# Patient Record
Sex: MALE | Race: WHITE
[De-identification: names, ages, dates, MRNs, and addresses within clinical notes are randomized per-mention and may not be internally consistent; named-entity substitution may affect disease eponyms.]

---

## 2019-12-03 ENCOUNTER — HOSPITAL ENCOUNTER (INPATIENT)
Dept: HOSPITAL 8 - OUT | Age: 74
LOS: 1 days | Discharge: HOME | DRG: 352 | End: 2019-12-04
Attending: SURGERY | Admitting: SURGERY
Payer: MEDICARE

## 2019-12-03 VITALS — BODY MASS INDEX: 22.14 KG/M2 | HEIGHT: 69 IN | WEIGHT: 149.47 LBS

## 2019-12-03 VITALS — SYSTOLIC BLOOD PRESSURE: 159 MMHG | DIASTOLIC BLOOD PRESSURE: 80 MMHG

## 2019-12-03 VITALS — DIASTOLIC BLOOD PRESSURE: 98 MMHG | SYSTOLIC BLOOD PRESSURE: 193 MMHG

## 2019-12-03 VITALS — SYSTOLIC BLOOD PRESSURE: 165 MMHG | DIASTOLIC BLOOD PRESSURE: 91 MMHG

## 2019-12-03 DIAGNOSIS — Z82.49: ICD-10-CM

## 2019-12-03 DIAGNOSIS — N40.0: ICD-10-CM

## 2019-12-03 DIAGNOSIS — I10: ICD-10-CM

## 2019-12-03 DIAGNOSIS — K40.90: Primary | ICD-10-CM

## 2019-12-03 DIAGNOSIS — E78.5: ICD-10-CM

## 2019-12-03 PROCEDURE — 0YU54JZ SUPPLEMENT RIGHT INGUINAL REGION WITH SYNTHETIC SUBSTITUTE, PERCUTANEOUS ENDOSCOPIC APPROACH: ICD-10-PCS | Performed by: SURGERY

## 2019-12-03 PROCEDURE — 8E0W4CZ ROBOTIC ASSISTED PROCEDURE OF TRUNK REGION, PERCUTANEOUS ENDOSCOPIC APPROACH: ICD-10-PCS | Performed by: SURGERY

## 2019-12-03 PROCEDURE — C1781 MESH (IMPLANTABLE): HCPCS

## 2019-12-03 RX ADMIN — IBUPROFEN SCH MG: 800 TABLET ORAL at 21:05

## 2019-12-03 RX ADMIN — IBUPROFEN SCH MG: 800 TABLET ORAL at 16:00

## 2019-12-03 RX ADMIN — SODIUM CHLORIDE, PRESERVATIVE FREE SCH ML: 5 INJECTION INTRAVENOUS at 21:00

## 2019-12-04 VITALS — SYSTOLIC BLOOD PRESSURE: 160 MMHG | DIASTOLIC BLOOD PRESSURE: 87 MMHG

## 2019-12-04 VITALS — SYSTOLIC BLOOD PRESSURE: 152 MMHG | DIASTOLIC BLOOD PRESSURE: 74 MMHG

## 2019-12-04 VITALS — DIASTOLIC BLOOD PRESSURE: 76 MMHG | SYSTOLIC BLOOD PRESSURE: 167 MMHG

## 2019-12-04 VITALS — DIASTOLIC BLOOD PRESSURE: 87 MMHG | SYSTOLIC BLOOD PRESSURE: 180 MMHG

## 2019-12-04 RX ADMIN — IBUPROFEN SCH MG: 800 TABLET ORAL at 09:06

## 2019-12-04 RX ADMIN — SODIUM CHLORIDE, PRESERVATIVE FREE SCH ML: 5 INJECTION INTRAVENOUS at 07:45

## 2019-12-07 ENCOUNTER — HOSPITAL ENCOUNTER (INPATIENT)
Dept: HOSPITAL 8 - ED | Age: 74
LOS: 11 days | Discharge: HOME | DRG: 329 | End: 2019-12-18
Attending: FAMILY MEDICINE | Admitting: EMERGENCY MEDICINE
Payer: MEDICARE

## 2019-12-07 VITALS — BODY MASS INDEX: 24.13 KG/M2 | WEIGHT: 162.92 LBS | HEIGHT: 69 IN

## 2019-12-07 VITALS — DIASTOLIC BLOOD PRESSURE: 76 MMHG | SYSTOLIC BLOOD PRESSURE: 122 MMHG

## 2019-12-07 DIAGNOSIS — J96.01: ICD-10-CM

## 2019-12-07 DIAGNOSIS — N40.0: ICD-10-CM

## 2019-12-07 DIAGNOSIS — J69.0: ICD-10-CM

## 2019-12-07 DIAGNOSIS — I10: ICD-10-CM

## 2019-12-07 DIAGNOSIS — E87.8: ICD-10-CM

## 2019-12-07 DIAGNOSIS — D64.9: ICD-10-CM

## 2019-12-07 DIAGNOSIS — K92.0: ICD-10-CM

## 2019-12-07 DIAGNOSIS — E78.5: ICD-10-CM

## 2019-12-07 DIAGNOSIS — E87.1: ICD-10-CM

## 2019-12-07 DIAGNOSIS — C85.90: ICD-10-CM

## 2019-12-07 DIAGNOSIS — K56.52: Primary | ICD-10-CM

## 2019-12-07 DIAGNOSIS — G93.41: ICD-10-CM

## 2019-12-07 DIAGNOSIS — N17.0: ICD-10-CM

## 2019-12-07 DIAGNOSIS — E87.6: ICD-10-CM

## 2019-12-07 DIAGNOSIS — E86.0: ICD-10-CM

## 2019-12-07 LAB
ALBUMIN SERPL-MCNC: 3.5 G/DL (ref 3.4–5)
ALP SERPL-CCNC: 57 U/L (ref 45–117)
ALT SERPL-CCNC: 22 U/L (ref 12–78)
ANION GAP SERPL CALC-SCNC: 10 MMOL/L (ref 5–15)
ANION GAP SERPL CALC-SCNC: 9 MMOL/L (ref 5–15)
APTT BLD: 24 SECONDS (ref 25–31)
BASOPHILS # BLD AUTO: 0.01 X10^3/UL (ref 0–0.1)
BASOPHILS # BLD AUTO: 0.01 X10^3/UL (ref 0–0.1)
BASOPHILS NFR BLD AUTO: 0 % (ref 0–1)
BASOPHILS NFR BLD AUTO: 0 % (ref 0–1)
BILIRUB SERPL-MCNC: 1.6 MG/DL (ref 0.2–1)
CALCIUM SERPL-MCNC: 11.1 MG/DL (ref 8.5–10.1)
CALCIUM SERPL-MCNC: 9.7 MG/DL (ref 8.5–10.1)
CHLORIDE SERPL-SCNC: 84 MMOL/L (ref 98–107)
CHLORIDE SERPL-SCNC: 90 MMOL/L (ref 98–107)
CREAT SERPL-MCNC: 1.45 MG/DL (ref 0.7–1.3)
CREAT SERPL-MCNC: 1.96 MG/DL (ref 0.7–1.3)
EOSINOPHIL # BLD AUTO: 0.01 X10^3/UL (ref 0–0.4)
EOSINOPHIL # BLD AUTO: 0.1 X10^3/UL (ref 0–0.4)
EOSINOPHIL NFR BLD AUTO: 0 % (ref 1–7)
EOSINOPHIL NFR BLD AUTO: 1 % (ref 1–7)
ERYTHROCYTE [DISTWIDTH] IN BLOOD BY AUTOMATED COUNT: 14.3 % (ref 9.4–14.8)
ERYTHROCYTE [DISTWIDTH] IN BLOOD BY AUTOMATED COUNT: 14.6 % (ref 9.4–14.8)
INR PPP: 0.93 (ref 0.93–1.1)
LYMPHOCYTES # BLD AUTO: 0.68 X10^3/UL (ref 1–3.4)
LYMPHOCYTES # BLD AUTO: 0.83 X10^3/UL (ref 1–3.4)
LYMPHOCYTES NFR BLD AUTO: 7 % (ref 22–44)
LYMPHOCYTES NFR BLD AUTO: 8 % (ref 22–44)
MCH RBC QN AUTO: 34.9 PG (ref 27.5–34.5)
MCH RBC QN AUTO: 34.9 PG (ref 27.5–34.5)
MCHC RBC AUTO-ENTMCNC: 33.8 G/DL (ref 33.2–36.2)
MCHC RBC AUTO-ENTMCNC: 34.1 G/DL (ref 33.2–36.2)
MCV RBC AUTO: 102.6 FL (ref 81–97)
MCV RBC AUTO: 103.2 FL (ref 81–97)
MD: (no result)
MD: NO
MONOCYTES # BLD AUTO: 0.43 X10^3/UL (ref 0.2–0.8)
MONOCYTES # BLD AUTO: 0.8 X10^3/UL (ref 0.2–0.8)
MONOCYTES NFR BLD AUTO: 5 % (ref 2–9)
MONOCYTES NFR BLD AUTO: 7 % (ref 2–9)
NEUTROPHILS # BLD AUTO: 10.66 X10^3/UL (ref 1.8–6.8)
NEUTROPHILS # BLD AUTO: 6.94 X10^3/UL (ref 1.8–6.8)
NEUTROPHILS NFR BLD AUTO: 85 % (ref 42–75)
NEUTROPHILS NFR BLD AUTO: 87 % (ref 42–75)
PLATELET # BLD AUTO: 123 X10^3/UL (ref 130–400)
PLATELET # BLD AUTO: 141 X10^3/UL (ref 130–400)
PMV BLD AUTO: 8.8 FL (ref 7.4–10.4)
PMV BLD AUTO: 9.1 FL (ref 7.4–10.4)
PROT SERPL-MCNC: 6.3 G/DL (ref 6.4–8.2)
PROTHROMBIN TIME: 9.8 SECONDS (ref 9.6–11.5)
RBC # BLD AUTO: 3.62 X10^6/UL (ref 4.38–5.82)
RBC # BLD AUTO: 3.89 X10^6/UL (ref 4.38–5.82)

## 2019-12-07 PROCEDURE — 84443 ASSAY THYROID STIM HORMONE: CPT

## 2019-12-07 PROCEDURE — 74245: CPT

## 2019-12-07 PROCEDURE — 85610 PROTHROMBIN TIME: CPT

## 2019-12-07 PROCEDURE — 94640 AIRWAY INHALATION TREATMENT: CPT

## 2019-12-07 PROCEDURE — 84145 PROCALCITONIN (PCT): CPT

## 2019-12-07 PROCEDURE — 81003 URINALYSIS AUTO W/O SCOPE: CPT

## 2019-12-07 PROCEDURE — 74018 RADEX ABDOMEN 1 VIEW: CPT

## 2019-12-07 PROCEDURE — 96374 THER/PROPH/DIAG INJ IV PUSH: CPT

## 2019-12-07 PROCEDURE — 74022 RADEX COMPL AQT ABD SERIES: CPT

## 2019-12-07 PROCEDURE — 36600 WITHDRAWAL OF ARTERIAL BLOOD: CPT

## 2019-12-07 PROCEDURE — 87324 CLOSTRIDIUM AG IA: CPT

## 2019-12-07 PROCEDURE — 83605 ASSAY OF LACTIC ACID: CPT

## 2019-12-07 PROCEDURE — 80069 RENAL FUNCTION PANEL: CPT

## 2019-12-07 PROCEDURE — 85025 COMPLETE CBC W/AUTO DIFF WBC: CPT

## 2019-12-07 PROCEDURE — 71045 X-RAY EXAM CHEST 1 VIEW: CPT

## 2019-12-07 PROCEDURE — 83735 ASSAY OF MAGNESIUM: CPT

## 2019-12-07 PROCEDURE — 88307 TISSUE EXAM BY PATHOLOGIST: CPT

## 2019-12-07 PROCEDURE — 93005 ELECTROCARDIOGRAM TRACING: CPT

## 2019-12-07 PROCEDURE — 80053 COMPREHEN METABOLIC PANEL: CPT

## 2019-12-07 PROCEDURE — 80048 BASIC METABOLIC PNL TOTAL CA: CPT

## 2019-12-07 PROCEDURE — 87081 CULTURE SCREEN ONLY: CPT

## 2019-12-07 PROCEDURE — 96375 TX/PRO/DX INJ NEW DRUG ADDON: CPT

## 2019-12-07 PROCEDURE — C9113 INJ PANTOPRAZOLE SODIUM, VIA: HCPCS

## 2019-12-07 PROCEDURE — 82803 BLOOD GASES ANY COMBINATION: CPT

## 2019-12-07 PROCEDURE — 85730 THROMBOPLASTIN TIME PARTIAL: CPT

## 2019-12-07 PROCEDURE — 36415 COLL VENOUS BLD VENIPUNCTURE: CPT

## 2019-12-07 PROCEDURE — 83690 ASSAY OF LIPASE: CPT

## 2019-12-07 PROCEDURE — 84100 ASSAY OF PHOSPHORUS: CPT

## 2019-12-07 RX ADMIN — SODIUM CHLORIDE AND POTASSIUM CHLORIDE SCH MLS/HR: 9; 2.98 INJECTION, SOLUTION INTRAVENOUS at 23:00

## 2019-12-07 RX ADMIN — SODIUM CHLORIDE AND POTASSIUM CHLORIDE SCH MLS/HR: 9; 2.98 INJECTION, SOLUTION INTRAVENOUS at 15:59

## 2019-12-07 RX ADMIN — PANTOPRAZOLE SODIUM SCH MLS/HR: 40 INJECTION, POWDER, FOR SOLUTION INTRAVENOUS at 23:14

## 2019-12-07 NOTE — NUR
PT HAS CO OF NOT RECOVERING FROM INGUINAL RIGHT SIDE HERNIA SURGERY, 
LAPROSCOPIC. PT STATES HE HAS HAD HICCUPS SINCE SURGERY AND VOMITTING "DARK 
BLACK GRANULES" AT NIGHT. PT ALSO UNABLE TO KEEP FOOD DOWN AND HAS GOTTEN 
WEAKER. PT CONSULTED SURGEON ABOUT CONCERNS AND WAS TOLD TO COME TO ED. PT NOT 
IN DISTRESS. LAST BM WAS WEDNESDAY. DENIES PASSING GAS.

## 2019-12-08 VITALS — SYSTOLIC BLOOD PRESSURE: 137 MMHG | DIASTOLIC BLOOD PRESSURE: 78 MMHG

## 2019-12-08 VITALS — SYSTOLIC BLOOD PRESSURE: 149 MMHG | DIASTOLIC BLOOD PRESSURE: 76 MMHG

## 2019-12-08 VITALS — SYSTOLIC BLOOD PRESSURE: 132 MMHG | DIASTOLIC BLOOD PRESSURE: 75 MMHG

## 2019-12-08 VITALS — DIASTOLIC BLOOD PRESSURE: 69 MMHG | SYSTOLIC BLOOD PRESSURE: 104 MMHG

## 2019-12-08 LAB
ANION GAP SERPL CALC-SCNC: 7 MMOL/L (ref 5–15)
BASOPHILS # BLD AUTO: 0.02 X10^3/UL (ref 0–0.1)
BASOPHILS NFR BLD AUTO: 0 % (ref 0–1)
CALCIUM SERPL-MCNC: 8.9 MG/DL (ref 8.5–10.1)
CHLORIDE SERPL-SCNC: 101 MMOL/L (ref 98–107)
CREAT SERPL-MCNC: 0.91 MG/DL (ref 0.7–1.3)
CULTURE INDICATED?: NO
EOSINOPHIL # BLD AUTO: 0.17 X10^3/UL (ref 0–0.4)
EOSINOPHIL NFR BLD AUTO: 3 % (ref 1–7)
ERYTHROCYTE [DISTWIDTH] IN BLOOD BY AUTOMATED COUNT: 14.7 % (ref 9.4–14.8)
LYMPHOCYTES # BLD AUTO: 0.61 X10^3/UL (ref 1–3.4)
LYMPHOCYTES NFR BLD AUTO: 11 % (ref 22–44)
MCH RBC QN AUTO: 35.3 PG (ref 27.5–34.5)
MCHC RBC AUTO-ENTMCNC: 34.4 G/DL (ref 33.2–36.2)
MCV RBC AUTO: 102.7 FL (ref 81–97)
MD: NO
MICROSCOPIC: (no result)
MONOCYTES # BLD AUTO: 0.44 X10^3/UL (ref 0.2–0.8)
MONOCYTES NFR BLD AUTO: 8 % (ref 2–9)
NEUTROPHILS # BLD AUTO: 4.35 X10^3/UL (ref 1.8–6.8)
NEUTROPHILS NFR BLD AUTO: 78 % (ref 42–75)
PLATELET # BLD AUTO: 103 X10^3/UL (ref 130–400)
PMV BLD AUTO: 9.3 FL (ref 7.4–10.4)
RBC # BLD AUTO: 3.2 X10^6/UL (ref 4.38–5.82)

## 2019-12-08 RX ADMIN — PANTOPRAZOLE SODIUM SCH MLS/HR: 40 INJECTION, POWDER, FOR SOLUTION INTRAVENOUS at 23:27

## 2019-12-08 RX ADMIN — SODIUM CHLORIDE AND POTASSIUM CHLORIDE SCH MLS/HR: 9; 2.98 INJECTION, SOLUTION INTRAVENOUS at 04:07

## 2019-12-08 RX ADMIN — TAMSULOSIN HYDROCHLORIDE SCH MG: 0.4 CAPSULE ORAL at 09:00

## 2019-12-08 RX ADMIN — PANTOPRAZOLE SODIUM SCH MLS/HR: 40 INJECTION, POWDER, FOR SOLUTION INTRAVENOUS at 10:08

## 2019-12-08 RX ADMIN — SODIUM CHLORIDE AND POTASSIUM CHLORIDE SCH MLS/HR: 9; 2.98 INJECTION, SOLUTION INTRAVENOUS at 12:56

## 2019-12-08 RX ADMIN — SODIUM CHLORIDE AND POTASSIUM CHLORIDE SCH MLS/HR: 9; 2.98 INJECTION, SOLUTION INTRAVENOUS at 23:27

## 2019-12-09 VITALS — SYSTOLIC BLOOD PRESSURE: 163 MMHG | DIASTOLIC BLOOD PRESSURE: 83 MMHG

## 2019-12-09 VITALS — SYSTOLIC BLOOD PRESSURE: 129 MMHG | DIASTOLIC BLOOD PRESSURE: 83 MMHG

## 2019-12-09 VITALS — SYSTOLIC BLOOD PRESSURE: 144 MMHG | DIASTOLIC BLOOD PRESSURE: 78 MMHG

## 2019-12-09 VITALS — SYSTOLIC BLOOD PRESSURE: 159 MMHG | DIASTOLIC BLOOD PRESSURE: 84 MMHG

## 2019-12-09 LAB
ALBUMIN SERPL-MCNC: 3 G/DL (ref 3.4–5)
ALP SERPL-CCNC: 50 U/L (ref 45–117)
ALT SERPL-CCNC: 18 U/L (ref 12–78)
ANION GAP SERPL CALC-SCNC: 10 MMOL/L (ref 5–15)
BASOPHILS # BLD AUTO: 0.03 X10^3/UL (ref 0–0.1)
BASOPHILS NFR BLD AUTO: 0 % (ref 0–1)
BILIRUB SERPL-MCNC: 1.2 MG/DL (ref 0.2–1)
CALCIUM SERPL-MCNC: 8.9 MG/DL (ref 8.5–10.1)
CHLORIDE SERPL-SCNC: 113 MMOL/L (ref 98–107)
CREAT SERPL-MCNC: 1 MG/DL (ref 0.7–1.3)
EOSINOPHIL # BLD AUTO: 0.01 X10^3/UL (ref 0–0.4)
EOSINOPHIL NFR BLD AUTO: 0 % (ref 1–7)
ERYTHROCYTE [DISTWIDTH] IN BLOOD BY AUTOMATED COUNT: 14.4 % (ref 9.4–14.8)
LYMPHOCYTES # BLD AUTO: 0.34 X10^3/UL (ref 1–3.4)
LYMPHOCYTES NFR BLD AUTO: 5 % (ref 22–44)
MCH RBC QN AUTO: 34.4 PG (ref 27.5–34.5)
MCHC RBC AUTO-ENTMCNC: 34.1 G/DL (ref 33.2–36.2)
MCV RBC AUTO: 100.9 FL (ref 81–97)
MD: NO
MONOCYTES # BLD AUTO: 0.39 X10^3/UL (ref 0.2–0.8)
MONOCYTES NFR BLD AUTO: 5 % (ref 2–9)
NEUTROPHILS # BLD AUTO: 6.67 X10^3/UL (ref 1.8–6.8)
NEUTROPHILS NFR BLD AUTO: 90 % (ref 42–75)
PLATELET # BLD AUTO: 127 X10^3/UL (ref 130–400)
PMV BLD AUTO: 8.3 FL (ref 7.4–10.4)
PROT SERPL-MCNC: 5.3 G/DL (ref 6.4–8.2)
RBC # BLD AUTO: 3.32 X10^6/UL (ref 4.38–5.82)

## 2019-12-09 RX ADMIN — PANTOPRAZOLE SODIUM SCH MLS/HR: 40 INJECTION, POWDER, FOR SOLUTION INTRAVENOUS at 21:43

## 2019-12-09 RX ADMIN — PANTOPRAZOLE SODIUM SCH MLS/HR: 40 INJECTION, POWDER, FOR SOLUTION INTRAVENOUS at 11:22

## 2019-12-09 RX ADMIN — SODIUM CHLORIDE AND POTASSIUM CHLORIDE SCH MLS/HR: 9; 2.98 INJECTION, SOLUTION INTRAVENOUS at 11:22

## 2019-12-09 RX ADMIN — TAMSULOSIN HYDROCHLORIDE SCH MG: 0.4 CAPSULE ORAL at 09:00

## 2019-12-10 VITALS — DIASTOLIC BLOOD PRESSURE: 82 MMHG | SYSTOLIC BLOOD PRESSURE: 157 MMHG

## 2019-12-10 VITALS — SYSTOLIC BLOOD PRESSURE: 165 MMHG | DIASTOLIC BLOOD PRESSURE: 87 MMHG

## 2019-12-10 VITALS — SYSTOLIC BLOOD PRESSURE: 145 MMHG | DIASTOLIC BLOOD PRESSURE: 87 MMHG

## 2019-12-10 VITALS — SYSTOLIC BLOOD PRESSURE: 145 MMHG | DIASTOLIC BLOOD PRESSURE: 81 MMHG

## 2019-12-10 RX ADMIN — PANTOPRAZOLE SODIUM SCH MLS/HR: 40 INJECTION, POWDER, FOR SOLUTION INTRAVENOUS at 09:17

## 2019-12-10 RX ADMIN — TAMSULOSIN HYDROCHLORIDE SCH MG: 0.4 CAPSULE ORAL at 08:04

## 2019-12-10 RX ADMIN — SODIUM CHLORIDE AND POTASSIUM CHLORIDE SCH MLS/HR: 9; 2.98 INJECTION, SOLUTION INTRAVENOUS at 14:23

## 2019-12-10 RX ADMIN — PANTOPRAZOLE SODIUM SCH MLS/HR: 40 INJECTION, POWDER, FOR SOLUTION INTRAVENOUS at 18:35

## 2019-12-10 RX ADMIN — SODIUM CHLORIDE AND POTASSIUM CHLORIDE SCH MLS/HR: 9; 2.98 INJECTION, SOLUTION INTRAVENOUS at 00:30

## 2019-12-11 VITALS — SYSTOLIC BLOOD PRESSURE: 165 MMHG | DIASTOLIC BLOOD PRESSURE: 88 MMHG

## 2019-12-11 VITALS — DIASTOLIC BLOOD PRESSURE: 92 MMHG | SYSTOLIC BLOOD PRESSURE: 165 MMHG

## 2019-12-11 VITALS — DIASTOLIC BLOOD PRESSURE: 90 MMHG | SYSTOLIC BLOOD PRESSURE: 156 MMHG

## 2019-12-11 VITALS — SYSTOLIC BLOOD PRESSURE: 159 MMHG | DIASTOLIC BLOOD PRESSURE: 85 MMHG

## 2019-12-11 RX ADMIN — SODIUM CHLORIDE AND POTASSIUM CHLORIDE SCH MLS/HR: 9; 2.98 INJECTION, SOLUTION INTRAVENOUS at 04:01

## 2019-12-11 RX ADMIN — PANTOPRAZOLE SODIUM SCH MLS/HR: 40 INJECTION, POWDER, FOR SOLUTION INTRAVENOUS at 04:01

## 2019-12-11 RX ADMIN — CALCIUM CARBONATE (ANTACID) CHEW TAB 500 MG PRN MG: 500 CHEW TAB at 20:50

## 2019-12-11 RX ADMIN — TAMSULOSIN HYDROCHLORIDE SCH MG: 0.4 CAPSULE ORAL at 08:36

## 2019-12-11 RX ADMIN — PANTOPRAZOLE SODIUM SCH MLS/HR: 40 INJECTION, POWDER, FOR SOLUTION INTRAVENOUS at 14:38

## 2019-12-11 RX ADMIN — SODIUM CHLORIDE AND POTASSIUM CHLORIDE SCH MLS/HR: 9; 2.98 INJECTION, SOLUTION INTRAVENOUS at 14:38

## 2019-12-12 VITALS — DIASTOLIC BLOOD PRESSURE: 76 MMHG | SYSTOLIC BLOOD PRESSURE: 98 MMHG

## 2019-12-12 VITALS — SYSTOLIC BLOOD PRESSURE: 149 MMHG | DIASTOLIC BLOOD PRESSURE: 89 MMHG

## 2019-12-12 VITALS — DIASTOLIC BLOOD PRESSURE: 83 MMHG | SYSTOLIC BLOOD PRESSURE: 148 MMHG

## 2019-12-12 VITALS — DIASTOLIC BLOOD PRESSURE: 85 MMHG | SYSTOLIC BLOOD PRESSURE: 162 MMHG

## 2019-12-12 PROCEDURE — 0DB80ZZ EXCISION OF SMALL INTESTINE, OPEN APPROACH: ICD-10-PCS | Performed by: SURGERY

## 2019-12-12 RX ADMIN — PANTOPRAZOLE SODIUM SCH MLS/HR: 40 INJECTION, POWDER, FOR SOLUTION INTRAVENOUS at 21:15

## 2019-12-12 RX ADMIN — PANTOPRAZOLE SODIUM SCH MLS/HR: 40 INJECTION, POWDER, FOR SOLUTION INTRAVENOUS at 12:41

## 2019-12-12 RX ADMIN — IPRATROPIUM BROMIDE AND ALBUTEROL SULFATE SCH ML: 2.5; .5 SOLUTION RESPIRATORY (INHALATION) at 23:00

## 2019-12-12 RX ADMIN — TAMSULOSIN HYDROCHLORIDE SCH MG: 0.4 CAPSULE ORAL at 08:57

## 2019-12-12 RX ADMIN — CALCIUM CARBONATE (ANTACID) CHEW TAB 500 MG PRN MG: 500 CHEW TAB at 02:27

## 2019-12-12 RX ADMIN — FENTANYL CITRATE PRN MCG: 50 INJECTION INTRAMUSCULAR; INTRAVENOUS at 17:10

## 2019-12-12 RX ADMIN — SODIUM CHLORIDE AND POTASSIUM CHLORIDE SCH MLS/HR: 9; 2.98 INJECTION, SOLUTION INTRAVENOUS at 03:59

## 2019-12-12 RX ADMIN — PANTOPRAZOLE SODIUM SCH MLS/HR: 40 INJECTION, POWDER, FOR SOLUTION INTRAVENOUS at 00:46

## 2019-12-12 RX ADMIN — IPRATROPIUM BROMIDE AND ALBUTEROL SULFATE SCH ML: 2.5; .5 SOLUTION RESPIRATORY (INHALATION) at 19:00

## 2019-12-12 RX ADMIN — TAZOBACTAM SODIUM AND PIPERACILLIN SODIUM SCH MLS/HR: 375; 3 INJECTION, SOLUTION INTRAVENOUS at 19:07

## 2019-12-12 RX ADMIN — CALCIUM CARBONATE (ANTACID) CHEW TAB 500 MG PRN MG: 500 CHEW TAB at 00:25

## 2019-12-12 RX ADMIN — FENTANYL CITRATE PRN MCG: 50 INJECTION INTRAMUSCULAR; INTRAVENOUS at 17:21

## 2019-12-12 RX ADMIN — SODIUM CHLORIDE AND POTASSIUM CHLORIDE SCH MLS/HR: 9; 2.98 INJECTION, SOLUTION INTRAVENOUS at 19:07

## 2019-12-13 VITALS — SYSTOLIC BLOOD PRESSURE: 103 MMHG | DIASTOLIC BLOOD PRESSURE: 62 MMHG

## 2019-12-13 VITALS — DIASTOLIC BLOOD PRESSURE: 62 MMHG | SYSTOLIC BLOOD PRESSURE: 102 MMHG

## 2019-12-13 VITALS — DIASTOLIC BLOOD PRESSURE: 61 MMHG | SYSTOLIC BLOOD PRESSURE: 91 MMHG

## 2019-12-13 LAB
<PLATELET ESTIMATE>: ADEQUATE
<PLT MORPHOLOGY>: (no result)
ALBUMIN SERPL-MCNC: 2.1 G/DL (ref 3.4–5)
ALP SERPL-CCNC: 35 U/L (ref 45–117)
ALT SERPL-CCNC: 20 U/L (ref 12–78)
ANION GAP SERPL CALC-SCNC: 9 MMOL/L (ref 5–15)
ANISOCYTOSIS BLD QL SMEAR: (no result)
BAND#(MANUAL): 7.32 X10^3/UL
BILIRUB SERPL-MCNC: 2 MG/DL (ref 0.2–1)
CALCIUM SERPL-MCNC: 7.7 MG/DL (ref 8.5–10.1)
CHLORIDE SERPL-SCNC: 111 MMOL/L (ref 98–107)
CREAT SERPL-MCNC: 1.22 MG/DL (ref 0.7–1.3)
ERYTHROCYTE [DISTWIDTH] IN BLOOD BY AUTOMATED COUNT: 14.2 % (ref 9.4–14.8)
LYMPH#(MANUAL): 0.12 X10^3/UL (ref 1–3.4)
LYMPHS% (MANUAL): 1 % (ref 22–44)
MACROCYTES BLD QL SMEAR: (no result)
MCH RBC QN AUTO: 35 PG (ref 27.5–34.5)
MCHC RBC AUTO-ENTMCNC: 34.2 G/DL (ref 33.2–36.2)
MCV RBC AUTO: 102.3 FL (ref 81–97)
MD: YES
METAMYELOCYTES# (MANUAL): 0.37 X10^3/UL (ref 0–0)
METAMYELOCYTES% (MANUAL): 3 % (ref 0–1)
MONOS#(MANUAL): 0.37 X10^3/UL (ref 0.3–2.7)
MONOS% (MANUAL): 3 % (ref 2–9)
NEUTS BAND NFR BLD: 60 % (ref 0–7)
O2 FLOW: 4 L/MIN
PLATELET # BLD AUTO: 136 X10^3/UL (ref 130–400)
PMNS WITH VACUOLES: (no result)
PMV BLD AUTO: 8.4 FL (ref 7.4–10.4)
PROT SERPL-MCNC: 4.5 G/DL (ref 6.4–8.2)
RBC # BLD AUTO: 3.09 X10^6/UL (ref 4.38–5.82)
SEG#(MANUAL): 4.03 X10^3/UL (ref 1.8–6.8)
SEGS% (MANUAL): 33 % (ref 42–75)
TOXIC GRAN: (no result)

## 2019-12-13 RX ADMIN — IPRATROPIUM BROMIDE AND ALBUTEROL SULFATE SCH ML: 2.5; .5 SOLUTION RESPIRATORY (INHALATION) at 07:00

## 2019-12-13 RX ADMIN — TAZOBACTAM SODIUM AND PIPERACILLIN SODIUM SCH MLS/HR: 375; 3 INJECTION, SOLUTION INTRAVENOUS at 00:13

## 2019-12-13 RX ADMIN — IPRATROPIUM BROMIDE AND ALBUTEROL SULFATE SCH ML: 2.5; .5 SOLUTION RESPIRATORY (INHALATION) at 03:03

## 2019-12-13 RX ADMIN — SODIUM CHLORIDE SCH MLS/HR: 0.9 INJECTION, SOLUTION INTRAVENOUS at 21:06

## 2019-12-13 RX ADMIN — SODIUM CHLORIDE SCH MLS/HR: 0.9 INJECTION, SOLUTION INTRAVENOUS at 10:56

## 2019-12-13 RX ADMIN — PANTOPRAZOLE SODIUM SCH MLS/HR: 40 INJECTION, POWDER, FOR SOLUTION INTRAVENOUS at 16:36

## 2019-12-13 RX ADMIN — TAZOBACTAM SODIUM AND PIPERACILLIN SODIUM SCH MLS/HR: 375; 3 INJECTION, SOLUTION INTRAVENOUS at 11:52

## 2019-12-13 RX ADMIN — TAZOBACTAM SODIUM AND PIPERACILLIN SODIUM SCH MLS/HR: 375; 3 INJECTION, SOLUTION INTRAVENOUS at 17:45

## 2019-12-13 RX ADMIN — PANTOPRAZOLE SODIUM SCH MLS/HR: 40 INJECTION, POWDER, FOR SOLUTION INTRAVENOUS at 06:10

## 2019-12-13 RX ADMIN — TAMSULOSIN HYDROCHLORIDE SCH MG: 0.4 CAPSULE ORAL at 10:55

## 2019-12-13 RX ADMIN — TAZOBACTAM SODIUM AND PIPERACILLIN SODIUM SCH MLS/HR: 375; 3 INJECTION, SOLUTION INTRAVENOUS at 06:08

## 2019-12-14 VITALS — SYSTOLIC BLOOD PRESSURE: 112 MMHG | DIASTOLIC BLOOD PRESSURE: 69 MMHG

## 2019-12-14 VITALS — DIASTOLIC BLOOD PRESSURE: 63 MMHG | SYSTOLIC BLOOD PRESSURE: 103 MMHG

## 2019-12-14 VITALS — DIASTOLIC BLOOD PRESSURE: 77 MMHG | SYSTOLIC BLOOD PRESSURE: 124 MMHG

## 2019-12-14 VITALS — SYSTOLIC BLOOD PRESSURE: 134 MMHG | DIASTOLIC BLOOD PRESSURE: 79 MMHG

## 2019-12-14 VITALS — DIASTOLIC BLOOD PRESSURE: 61 MMHG | SYSTOLIC BLOOD PRESSURE: 112 MMHG

## 2019-12-14 LAB
ALBUMIN SERPL-MCNC: 2 G/DL (ref 3.4–5)
ALP SERPL-CCNC: 47 U/L (ref 45–117)
ALT SERPL-CCNC: 19 U/L (ref 12–78)
ANION GAP SERPL CALC-SCNC: 7 MMOL/L (ref 5–15)
BASOPHILS # BLD AUTO: 0 X10^3/UL (ref 0–0.1)
BASOPHILS NFR BLD AUTO: 0 % (ref 0–1)
BILIRUB SERPL-MCNC: 1.3 MG/DL (ref 0.2–1)
CALCIUM SERPL-MCNC: 7.8 MG/DL (ref 8.5–10.1)
CHLORIDE SERPL-SCNC: 109 MMOL/L (ref 98–107)
CREAT SERPL-MCNC: 0.98 MG/DL (ref 0.7–1.3)
EOSINOPHIL # BLD AUTO: 0.06 X10^3/UL (ref 0–0.4)
EOSINOPHIL NFR BLD AUTO: 1 % (ref 1–7)
ERYTHROCYTE [DISTWIDTH] IN BLOOD BY AUTOMATED COUNT: 14.4 % (ref 9.4–14.8)
LYMPHOCYTES # BLD AUTO: 0.39 X10^3/UL (ref 1–3.4)
LYMPHOCYTES NFR BLD AUTO: 4 % (ref 22–44)
MCH RBC QN AUTO: 35.1 PG (ref 27.5–34.5)
MCHC RBC AUTO-ENTMCNC: 34 G/DL (ref 33.2–36.2)
MCV RBC AUTO: 103 FL (ref 81–97)
MD: NO
MONOCYTES # BLD AUTO: 0.18 X10^3/UL (ref 0.2–0.8)
MONOCYTES NFR BLD AUTO: 2 % (ref 2–9)
NEUTROPHILS # BLD AUTO: 9.45 X10^3/UL (ref 1.8–6.8)
NEUTROPHILS NFR BLD AUTO: 94 % (ref 42–75)
PLATELET # BLD AUTO: 129 X10^3/UL (ref 130–400)
PMV BLD AUTO: 8.6 FL (ref 7.4–10.4)
PROT SERPL-MCNC: 4.7 G/DL (ref 6.4–8.2)
RBC # BLD AUTO: 2.75 X10^6/UL (ref 4.38–5.82)

## 2019-12-14 RX ADMIN — CHOLECALCIFEROL TAB 10 MCG (400 UNIT) SCH UNITS: 10 TAB at 18:44

## 2019-12-14 RX ADMIN — TAMSULOSIN HYDROCHLORIDE SCH MG: 0.4 CAPSULE ORAL at 09:04

## 2019-12-14 RX ADMIN — TAZOBACTAM SODIUM AND PIPERACILLIN SODIUM SCH MLS/HR: 375; 3 INJECTION, SOLUTION INTRAVENOUS at 18:05

## 2019-12-14 RX ADMIN — PANTOPRAZOLE SODIUM SCH MG: 20 TABLET, DELAYED RELEASE ORAL at 09:04

## 2019-12-14 RX ADMIN — TAZOBACTAM SODIUM AND PIPERACILLIN SODIUM SCH MLS/HR: 375; 3 INJECTION, SOLUTION INTRAVENOUS at 06:25

## 2019-12-14 RX ADMIN — PANTOPRAZOLE SODIUM SCH MLS/HR: 40 INJECTION, POWDER, FOR SOLUTION INTRAVENOUS at 03:10

## 2019-12-14 RX ADMIN — SODIUM CHLORIDE SCH MLS/HR: 0.9 INJECTION, SOLUTION INTRAVENOUS at 18:05

## 2019-12-14 RX ADMIN — PANTOPRAZOLE SODIUM SCH MG: 20 TABLET, DELAYED RELEASE ORAL at 20:35

## 2019-12-14 RX ADMIN — SODIUM CHLORIDE SCH MLS/HR: 0.9 INJECTION, SOLUTION INTRAVENOUS at 06:26

## 2019-12-14 RX ADMIN — SODIUM CHLORIDE, PRESERVATIVE FREE SCH ML: 5 INJECTION INTRAVENOUS at 09:12

## 2019-12-14 RX ADMIN — B-COMPLEX W/ C & FOLIC ACID TAB SCH TAB: TAB at 09:04

## 2019-12-14 RX ADMIN — CALCIUM CARBONATE (ANTACID) CHEW TAB 500 MG SCH MG: 500 CHEW TAB at 09:05

## 2019-12-14 RX ADMIN — SODIUM CHLORIDE, PRESERVATIVE FREE SCH ML: 5 INJECTION INTRAVENOUS at 21:00

## 2019-12-14 RX ADMIN — TAZOBACTAM SODIUM AND PIPERACILLIN SODIUM SCH MLS/HR: 375; 3 INJECTION, SOLUTION INTRAVENOUS at 12:49

## 2019-12-14 RX ADMIN — OXYCODONE HYDROCHLORIDE AND ACETAMINOPHEN SCH MG: 500 TABLET ORAL at 18:05

## 2019-12-14 RX ADMIN — TAZOBACTAM SODIUM AND PIPERACILLIN SODIUM SCH MLS/HR: 375; 3 INJECTION, SOLUTION INTRAVENOUS at 00:32

## 2019-12-14 RX ADMIN — CALCIUM CARBONATE (ANTACID) CHEW TAB 500 MG SCH MG: 500 CHEW TAB at 20:35

## 2019-12-15 VITALS — SYSTOLIC BLOOD PRESSURE: 146 MMHG | DIASTOLIC BLOOD PRESSURE: 76 MMHG

## 2019-12-15 VITALS — SYSTOLIC BLOOD PRESSURE: 122 MMHG | DIASTOLIC BLOOD PRESSURE: 76 MMHG

## 2019-12-15 VITALS — DIASTOLIC BLOOD PRESSURE: 78 MMHG | SYSTOLIC BLOOD PRESSURE: 135 MMHG

## 2019-12-15 VITALS — SYSTOLIC BLOOD PRESSURE: 128 MMHG | DIASTOLIC BLOOD PRESSURE: 69 MMHG

## 2019-12-15 LAB
ALBUMIN SERPL-MCNC: 1.8 G/DL (ref 3.4–5)
ANION GAP SERPL CALC-SCNC: 6 MMOL/L (ref 5–15)
BASOPHILS # BLD AUTO: 0 X10^3/UL (ref 0–0.1)
BASOPHILS NFR BLD AUTO: 0 % (ref 0–1)
CALCIUM SERPL-MCNC: 7.7 MG/DL (ref 8.5–10.1)
CHLORIDE SERPL-SCNC: 109 MMOL/L (ref 98–107)
CREAT SERPL-MCNC: 0.85 MG/DL (ref 0.7–1.3)
EOSINOPHIL # BLD AUTO: 0.01 X10^3/UL (ref 0–0.4)
EOSINOPHIL NFR BLD AUTO: 0 % (ref 1–7)
ERYTHROCYTE [DISTWIDTH] IN BLOOD BY AUTOMATED COUNT: 14.3 % (ref 9.4–14.8)
LYMPHOCYTES # BLD AUTO: 0.48 X10^3/UL (ref 1–3.4)
LYMPHOCYTES NFR BLD AUTO: 4 % (ref 22–44)
MCH RBC QN AUTO: 34.5 PG (ref 27.5–34.5)
MCHC RBC AUTO-ENTMCNC: 33.7 G/DL (ref 33.2–36.2)
MCV RBC AUTO: 102.2 FL (ref 81–97)
MD: (no result)
MONOCYTES # BLD AUTO: 0.31 X10^3/UL (ref 0.2–0.8)
MONOCYTES NFR BLD AUTO: 3 % (ref 2–9)
NEUTROPHILS # BLD AUTO: 11.54 X10^3/UL (ref 1.8–6.8)
NEUTROPHILS NFR BLD AUTO: 94 % (ref 42–75)
PLATELET # BLD AUTO: 149 X10^3/UL (ref 130–400)
PMV BLD AUTO: 8.5 FL (ref 7.4–10.4)
RBC # BLD AUTO: 2.68 X10^6/UL (ref 4.38–5.82)

## 2019-12-15 RX ADMIN — TAZOBACTAM SODIUM AND PIPERACILLIN SODIUM SCH MLS/HR: 375; 3 INJECTION, SOLUTION INTRAVENOUS at 00:23

## 2019-12-15 RX ADMIN — CALCIUM CARBONATE (ANTACID) CHEW TAB 500 MG SCH MG: 500 CHEW TAB at 07:40

## 2019-12-15 RX ADMIN — TAZOBACTAM SODIUM AND PIPERACILLIN SODIUM SCH MLS/HR: 375; 3 INJECTION, SOLUTION INTRAVENOUS at 17:47

## 2019-12-15 RX ADMIN — SODIUM CHLORIDE SCH MLS/HR: 0.9 INJECTION, SOLUTION INTRAVENOUS at 23:53

## 2019-12-15 RX ADMIN — TAZOBACTAM SODIUM AND PIPERACILLIN SODIUM SCH MLS/HR: 375; 3 INJECTION, SOLUTION INTRAVENOUS at 13:19

## 2019-12-15 RX ADMIN — CHOLECALCIFEROL TAB 10 MCG (400 UNIT) SCH UNITS: 10 TAB at 16:23

## 2019-12-15 RX ADMIN — SODIUM CHLORIDE, PRESERVATIVE FREE SCH ML: 5 INJECTION INTRAVENOUS at 07:41

## 2019-12-15 RX ADMIN — TAZOBACTAM SODIUM AND PIPERACILLIN SODIUM SCH MLS/HR: 375; 3 INJECTION, SOLUTION INTRAVENOUS at 23:52

## 2019-12-15 RX ADMIN — TAZOBACTAM SODIUM AND PIPERACILLIN SODIUM SCH MLS/HR: 375; 3 INJECTION, SOLUTION INTRAVENOUS at 06:05

## 2019-12-15 RX ADMIN — TAMSULOSIN HYDROCHLORIDE SCH MG: 0.4 CAPSULE ORAL at 07:40

## 2019-12-15 RX ADMIN — PANTOPRAZOLE SODIUM SCH MG: 20 TABLET, DELAYED RELEASE ORAL at 07:40

## 2019-12-15 RX ADMIN — SODIUM CHLORIDE SCH MLS/HR: 0.9 INJECTION, SOLUTION INTRAVENOUS at 06:05

## 2019-12-15 RX ADMIN — CALCIUM CARBONATE (ANTACID) CHEW TAB 500 MG SCH MG: 500 CHEW TAB at 21:48

## 2019-12-15 RX ADMIN — SODIUM CHLORIDE SCH MLS/HR: 0.9 INJECTION, SOLUTION INTRAVENOUS at 15:00

## 2019-12-15 RX ADMIN — OXYCODONE HYDROCHLORIDE AND ACETAMINOPHEN SCH MG: 500 TABLET ORAL at 16:23

## 2019-12-15 RX ADMIN — PANTOPRAZOLE SODIUM SCH MG: 20 TABLET, DELAYED RELEASE ORAL at 21:48

## 2019-12-15 RX ADMIN — SODIUM CHLORIDE, PRESERVATIVE FREE SCH ML: 5 INJECTION INTRAVENOUS at 21:49

## 2019-12-15 RX ADMIN — OXYCODONE HYDROCHLORIDE AND ACETAMINOPHEN SCH MG: 500 TABLET ORAL at 07:40

## 2019-12-15 RX ADMIN — B-COMPLEX W/ C & FOLIC ACID TAB SCH TAB: TAB at 07:40

## 2019-12-16 VITALS — SYSTOLIC BLOOD PRESSURE: 136 MMHG | DIASTOLIC BLOOD PRESSURE: 75 MMHG

## 2019-12-16 VITALS — DIASTOLIC BLOOD PRESSURE: 87 MMHG | SYSTOLIC BLOOD PRESSURE: 144 MMHG

## 2019-12-16 VITALS — SYSTOLIC BLOOD PRESSURE: 123 MMHG | DIASTOLIC BLOOD PRESSURE: 71 MMHG

## 2019-12-16 VITALS — DIASTOLIC BLOOD PRESSURE: 86 MMHG | SYSTOLIC BLOOD PRESSURE: 136 MMHG

## 2019-12-16 LAB
ALBUMIN SERPL-MCNC: 1.9 G/DL (ref 3.4–5)
ANION GAP SERPL CALC-SCNC: 9 MMOL/L (ref 5–15)
BASOPHILS # BLD AUTO: 0 X10^3/UL (ref 0–0.1)
BASOPHILS NFR BLD AUTO: 0 % (ref 0–1)
CALCIUM SERPL-MCNC: 7.7 MG/DL (ref 8.5–10.1)
CHLORIDE SERPL-SCNC: 109 MMOL/L (ref 98–107)
CLOSTRIDIUM DIFFICILE ANTIGEN: NEGATIVE
CLOSTRIDIUM DIFFICILE TOXIN: NEGATIVE
CREAT SERPL-MCNC: 0.81 MG/DL (ref 0.7–1.3)
EOSINOPHIL # BLD AUTO: 0.13 X10^3/UL (ref 0–0.4)
EOSINOPHIL NFR BLD AUTO: 1 % (ref 1–7)
ERYTHROCYTE [DISTWIDTH] IN BLOOD BY AUTOMATED COUNT: 14 % (ref 9.4–14.8)
LYMPHOCYTES # BLD AUTO: 0.49 X10^3/UL (ref 1–3.4)
LYMPHOCYTES NFR BLD AUTO: 4 % (ref 22–44)
MCH RBC QN AUTO: 34.4 PG (ref 27.5–34.5)
MCHC RBC AUTO-ENTMCNC: 34 G/DL (ref 33.2–36.2)
MCV RBC AUTO: 101.2 FL (ref 81–97)
MD: (no result)
MONOCYTES # BLD AUTO: 0.56 X10^3/UL (ref 0.2–0.8)
MONOCYTES NFR BLD AUTO: 5 % (ref 2–9)
NEUTROPHILS # BLD AUTO: 11.22 X10^3/UL (ref 1.8–6.8)
NEUTROPHILS NFR BLD AUTO: 91 % (ref 42–75)
PLATELET # BLD AUTO: 177 X10^3/UL (ref 130–400)
PMV BLD AUTO: 8.6 FL (ref 7.4–10.4)
RBC # BLD AUTO: 2.84 X10^6/UL (ref 4.38–5.82)

## 2019-12-16 RX ADMIN — CALCIUM CARBONATE (ANTACID) CHEW TAB 500 MG SCH MG: 500 CHEW TAB at 21:25

## 2019-12-16 RX ADMIN — PANTOPRAZOLE SODIUM SCH MG: 20 TABLET, DELAYED RELEASE ORAL at 21:25

## 2019-12-16 RX ADMIN — PANTOPRAZOLE SODIUM SCH MG: 20 TABLET, DELAYED RELEASE ORAL at 09:03

## 2019-12-16 RX ADMIN — SODIUM CHLORIDE, PRESERVATIVE FREE SCH ML: 5 INJECTION INTRAVENOUS at 21:25

## 2019-12-16 RX ADMIN — TAZOBACTAM SODIUM AND PIPERACILLIN SODIUM SCH MLS/HR: 375; 3 INJECTION, SOLUTION INTRAVENOUS at 18:19

## 2019-12-16 RX ADMIN — CHOLECALCIFEROL TAB 10 MCG (400 UNIT) SCH UNITS: 10 TAB at 17:25

## 2019-12-16 RX ADMIN — TAMSULOSIN HYDROCHLORIDE SCH MG: 0.4 CAPSULE ORAL at 09:02

## 2019-12-16 RX ADMIN — OXYCODONE HYDROCHLORIDE AND ACETAMINOPHEN SCH MG: 500 TABLET ORAL at 17:25

## 2019-12-16 RX ADMIN — PSYLLIUM HUSK SCH PACKET: 3.4 GRANULE ORAL at 09:02

## 2019-12-16 RX ADMIN — CALCIUM CARBONATE (ANTACID) CHEW TAB 500 MG SCH MG: 500 CHEW TAB at 09:02

## 2019-12-16 RX ADMIN — TAZOBACTAM SODIUM AND PIPERACILLIN SODIUM SCH MLS/HR: 375; 3 INJECTION, SOLUTION INTRAVENOUS at 05:50

## 2019-12-16 RX ADMIN — TAZOBACTAM SODIUM AND PIPERACILLIN SODIUM SCH MLS/HR: 375; 3 INJECTION, SOLUTION INTRAVENOUS at 23:49

## 2019-12-16 RX ADMIN — B-COMPLEX W/ C & FOLIC ACID TAB SCH TAB: TAB at 09:02

## 2019-12-16 RX ADMIN — OXYCODONE HYDROCHLORIDE AND ACETAMINOPHEN SCH MG: 500 TABLET ORAL at 09:03

## 2019-12-16 RX ADMIN — TAZOBACTAM SODIUM AND PIPERACILLIN SODIUM SCH MLS/HR: 375; 3 INJECTION, SOLUTION INTRAVENOUS at 12:14

## 2019-12-16 RX ADMIN — SODIUM CHLORIDE SCH MLS/HR: 0.9 INJECTION, SOLUTION INTRAVENOUS at 09:03

## 2019-12-16 RX ADMIN — SODIUM CHLORIDE, PRESERVATIVE FREE SCH ML: 5 INJECTION INTRAVENOUS at 09:00

## 2019-12-17 VITALS — DIASTOLIC BLOOD PRESSURE: 81 MMHG | SYSTOLIC BLOOD PRESSURE: 133 MMHG

## 2019-12-17 VITALS — SYSTOLIC BLOOD PRESSURE: 125 MMHG | DIASTOLIC BLOOD PRESSURE: 87 MMHG

## 2019-12-17 VITALS — DIASTOLIC BLOOD PRESSURE: 73 MMHG | SYSTOLIC BLOOD PRESSURE: 116 MMHG

## 2019-12-17 LAB
ALBUMIN SERPL-MCNC: 1.8 G/DL (ref 3.4–5)
ANION GAP SERPL CALC-SCNC: 8 MMOL/L (ref 5–15)
BASOPHILS # BLD AUTO: 0.01 X10^3/UL (ref 0–0.1)
BASOPHILS NFR BLD AUTO: 0 % (ref 0–1)
CALCIUM SERPL-MCNC: 7.7 MG/DL (ref 8.5–10.1)
CHLORIDE SERPL-SCNC: 110 MMOL/L (ref 98–107)
CREAT SERPL-MCNC: 0.74 MG/DL (ref 0.7–1.3)
EOSINOPHIL # BLD AUTO: 0.21 X10^3/UL (ref 0–0.4)
EOSINOPHIL NFR BLD AUTO: 3 % (ref 1–7)
ERYTHROCYTE [DISTWIDTH] IN BLOOD BY AUTOMATED COUNT: 14.1 % (ref 9.4–14.8)
LYMPHOCYTES # BLD AUTO: 0.48 X10^3/UL (ref 1–3.4)
LYMPHOCYTES NFR BLD AUTO: 6 % (ref 22–44)
MCH RBC QN AUTO: 34.3 PG (ref 27.5–34.5)
MCHC RBC AUTO-ENTMCNC: 33.6 G/DL (ref 33.2–36.2)
MCV RBC AUTO: 101.9 FL (ref 81–97)
MD: NO
MONOCYTES # BLD AUTO: 0.41 X10^3/UL (ref 0.2–0.8)
MONOCYTES NFR BLD AUTO: 5 % (ref 2–9)
NEUTROPHILS # BLD AUTO: 6.59 X10^3/UL (ref 1.8–6.8)
NEUTROPHILS NFR BLD AUTO: 86 % (ref 42–75)
PLATELET # BLD AUTO: 167 X10^3/UL (ref 130–400)
PMV BLD AUTO: 8.1 FL (ref 7.4–10.4)
RBC # BLD AUTO: 2.57 X10^6/UL (ref 4.38–5.82)

## 2019-12-17 RX ADMIN — PANTOPRAZOLE SODIUM SCH MG: 20 TABLET, DELAYED RELEASE ORAL at 08:26

## 2019-12-17 RX ADMIN — OXYCODONE HYDROCHLORIDE AND ACETAMINOPHEN SCH MG: 500 TABLET ORAL at 08:26

## 2019-12-17 RX ADMIN — CALCIUM CARBONATE (ANTACID) CHEW TAB 500 MG SCH MG: 500 CHEW TAB at 08:26

## 2019-12-17 RX ADMIN — CHOLECALCIFEROL TAB 10 MCG (400 UNIT) SCH UNITS: 10 TAB at 17:27

## 2019-12-17 RX ADMIN — TAZOBACTAM SODIUM AND PIPERACILLIN SODIUM SCH MLS/HR: 375; 3 INJECTION, SOLUTION INTRAVENOUS at 17:27

## 2019-12-17 RX ADMIN — SODIUM CHLORIDE SCH MLS/HR: 0.9 INJECTION, SOLUTION INTRAVENOUS at 17:28

## 2019-12-17 RX ADMIN — PSYLLIUM HUSK SCH PACKET: 3.4 GRANULE ORAL at 09:00

## 2019-12-17 RX ADMIN — B-COMPLEX W/ C & FOLIC ACID TAB SCH TAB: TAB at 08:26

## 2019-12-17 RX ADMIN — CALCIUM CARBONATE (ANTACID) CHEW TAB 500 MG SCH MG: 500 CHEW TAB at 20:10

## 2019-12-17 RX ADMIN — OXYCODONE HYDROCHLORIDE AND ACETAMINOPHEN SCH MG: 500 TABLET ORAL at 17:27

## 2019-12-17 RX ADMIN — PANTOPRAZOLE SODIUM SCH MG: 20 TABLET, DELAYED RELEASE ORAL at 20:10

## 2019-12-17 RX ADMIN — TAZOBACTAM SODIUM AND PIPERACILLIN SODIUM SCH MLS/HR: 375; 3 INJECTION, SOLUTION INTRAVENOUS at 12:18

## 2019-12-17 RX ADMIN — SODIUM CHLORIDE, PRESERVATIVE FREE SCH ML: 5 INJECTION INTRAVENOUS at 08:27

## 2019-12-17 RX ADMIN — TAZOBACTAM SODIUM AND PIPERACILLIN SODIUM SCH MLS/HR: 375; 3 INJECTION, SOLUTION INTRAVENOUS at 06:13

## 2019-12-17 RX ADMIN — TAMSULOSIN HYDROCHLORIDE SCH MG: 0.4 CAPSULE ORAL at 08:26

## 2019-12-17 RX ADMIN — SODIUM CHLORIDE, PRESERVATIVE FREE SCH ML: 5 INJECTION INTRAVENOUS at 20:10

## 2019-12-18 VITALS — SYSTOLIC BLOOD PRESSURE: 158 MMHG | DIASTOLIC BLOOD PRESSURE: 80 MMHG

## 2019-12-18 VITALS — SYSTOLIC BLOOD PRESSURE: 163 MMHG | DIASTOLIC BLOOD PRESSURE: 81 MMHG

## 2019-12-18 LAB
ALBUMIN SERPL-MCNC: 1.8 G/DL (ref 3.4–5)
ANION GAP SERPL CALC-SCNC: 7 MMOL/L (ref 5–15)
BASOPHILS # BLD AUTO: 0.01 X10^3/UL (ref 0–0.1)
BASOPHILS NFR BLD AUTO: 0 % (ref 0–1)
CALCIUM SERPL-MCNC: 8 MG/DL (ref 8.5–10.1)
CHLORIDE SERPL-SCNC: 110 MMOL/L (ref 98–107)
CREAT SERPL-MCNC: 0.75 MG/DL (ref 0.7–1.3)
EOSINOPHIL # BLD AUTO: 0.07 X10^3/UL (ref 0–0.4)
EOSINOPHIL NFR BLD AUTO: 2 % (ref 1–7)
ERYTHROCYTE [DISTWIDTH] IN BLOOD BY AUTOMATED COUNT: 14.3 % (ref 9.4–14.8)
LYMPHOCYTES # BLD AUTO: 0.43 X10^3/UL (ref 1–3.4)
LYMPHOCYTES NFR BLD AUTO: 9 % (ref 22–44)
MCH RBC QN AUTO: 33.7 PG (ref 27.5–34.5)
MCHC RBC AUTO-ENTMCNC: 33.9 G/DL (ref 33.2–36.2)
MCV RBC AUTO: 99.4 FL (ref 81–97)
MD: NO
MONOCYTES # BLD AUTO: 0.33 X10^3/UL (ref 0.2–0.8)
MONOCYTES NFR BLD AUTO: 7 % (ref 2–9)
NEUTROPHILS # BLD AUTO: 3.91 X10^3/UL (ref 1.8–6.8)
NEUTROPHILS NFR BLD AUTO: 82 % (ref 42–75)
PLATELET # BLD AUTO: 172 X10^3/UL (ref 130–400)
PMV BLD AUTO: 7.6 FL (ref 7.4–10.4)
RBC # BLD AUTO: 2.63 X10^6/UL (ref 4.38–5.82)

## 2019-12-18 RX ADMIN — PANTOPRAZOLE SODIUM SCH MG: 20 TABLET, DELAYED RELEASE ORAL at 08:52

## 2019-12-18 RX ADMIN — B-COMPLEX W/ C & FOLIC ACID TAB SCH TAB: TAB at 08:51

## 2019-12-18 RX ADMIN — OXYCODONE HYDROCHLORIDE AND ACETAMINOPHEN SCH MG: 500 TABLET ORAL at 08:52

## 2019-12-18 RX ADMIN — TAZOBACTAM SODIUM AND PIPERACILLIN SODIUM SCH MLS/HR: 375; 3 INJECTION, SOLUTION INTRAVENOUS at 12:00

## 2019-12-18 RX ADMIN — TAZOBACTAM SODIUM AND PIPERACILLIN SODIUM SCH MLS/HR: 375; 3 INJECTION, SOLUTION INTRAVENOUS at 06:30

## 2019-12-18 RX ADMIN — SODIUM CHLORIDE, PRESERVATIVE FREE SCH ML: 5 INJECTION INTRAVENOUS at 08:52

## 2019-12-18 RX ADMIN — TAZOBACTAM SODIUM AND PIPERACILLIN SODIUM SCH MLS/HR: 375; 3 INJECTION, SOLUTION INTRAVENOUS at 00:59

## 2019-12-18 RX ADMIN — CALCIUM CARBONATE (ANTACID) CHEW TAB 500 MG SCH MG: 500 CHEW TAB at 08:52

## 2019-12-18 RX ADMIN — TAMSULOSIN HYDROCHLORIDE SCH MG: 0.4 CAPSULE ORAL at 08:51

## 2019-12-18 RX ADMIN — PSYLLIUM HUSK SCH PACKET: 3.4 GRANULE ORAL at 07:46

## 2021-01-15 DIAGNOSIS — Z23 NEED FOR VACCINATION: ICD-10-CM
